# Patient Record
Sex: FEMALE | Race: WHITE
[De-identification: names, ages, dates, MRNs, and addresses within clinical notes are randomized per-mention and may not be internally consistent; named-entity substitution may affect disease eponyms.]

---

## 2019-02-13 VITALS — SYSTOLIC BLOOD PRESSURE: 145 MMHG | DIASTOLIC BLOOD PRESSURE: 73 MMHG

## 2019-02-13 LAB
APTT PPP: 29.4 SEC (ref 26.3–35.5)
BASOPHILS NFR BLD AUTO: 0.8 % (ref 0–5)
BUN SERPL-MCNC: 17 MG/DL (ref 7–18)
CHLORIDE SERPL-SCNC: 106 MMOL/L (ref 101–111)
CO2 SERPL-SCNC: 31 MMOL/L (ref 21–32)
CREAT SERPL-MCNC: 0.9 MG/DL (ref 0.5–1.5)
EOSINOPHIL NFR BLD AUTO: 5.3 % (ref 0–8)
ERYTHROCYTE [DISTWIDTH] IN BLOOD BY AUTOMATED COUNT: 13.5 % (ref 11–15.5)
GFR SERPL CREATININE-BSD FRML MDRD: 66 ML/MIN (ref 60–?)
GLUCOSE SERPL-MCNC: 113 MG/DL (ref 70–105)
HCT VFR BLD AUTO: 40.9 % (ref 36–48)
INR PPP: 0.95 (ref 0.85–1.15)
LYMPHOCYTES NFR SPEC AUTO: 26.7 % (ref 21–51)
MCH RBC QN AUTO: 29.9 PG (ref 27–33)
MCHC RBC AUTO-ENTMCNC: 33.5 G/DL (ref 32–36)
MCV RBC AUTO: 89.4 FL (ref 79–99)
MONOCYTES NFR BLD AUTO: 8 % (ref 3–13)
NEUTROPHILS NFR BLD AUTO: 59.2 % (ref 40–77)
NRBC BLD MANUAL-RTO: 0.1 % (ref 0–0.19)
PH UR STRIP: 6.5 [PH] (ref 5–8)
PLATELET # BLD AUTO: 218 K/UL (ref 130–400)
POTASSIUM SERPL-SCNC: 4.1 MMOL/L (ref 3.5–5.1)
PROTHROMBIN TIME: 10 SEC (ref 9.6–11.6)
RBC # BLD AUTO: 4.57 MIL/UL (ref 4–5.5)
SODIUM SERPL-SCNC: 143 MMOL/L (ref 136–145)
SP GR UR STRIP: 1.01 (ref 1–1.03)
UROBILINOGEN UR STRIP-MCNC: 0.2 MG/DL (ref 0.2–1)
WBC # BLD AUTO: 4.4 K/UL (ref 4.8–10.8)

## 2019-02-15 ENCOUNTER — HOSPITAL ENCOUNTER (OUTPATIENT)
Dept: HOSPITAL 90 - DAH | Age: 69
Discharge: HOME | End: 2019-02-15
Attending: INTERNAL MEDICINE
Payer: COMMERCIAL

## 2019-02-15 VITALS — SYSTOLIC BLOOD PRESSURE: 160 MMHG | DIASTOLIC BLOOD PRESSURE: 124 MMHG

## 2019-02-15 VITALS — DIASTOLIC BLOOD PRESSURE: 73 MMHG | SYSTOLIC BLOOD PRESSURE: 154 MMHG

## 2019-02-15 VITALS — DIASTOLIC BLOOD PRESSURE: 65 MMHG | SYSTOLIC BLOOD PRESSURE: 167 MMHG

## 2019-02-15 VITALS — HEIGHT: 62 IN | BODY MASS INDEX: 45.38 KG/M2 | WEIGHT: 246.6 LBS

## 2019-02-15 VITALS — DIASTOLIC BLOOD PRESSURE: 63 MMHG | SYSTOLIC BLOOD PRESSURE: 159 MMHG

## 2019-02-15 VITALS — DIASTOLIC BLOOD PRESSURE: 72 MMHG | SYSTOLIC BLOOD PRESSURE: 135 MMHG

## 2019-02-15 VITALS — DIASTOLIC BLOOD PRESSURE: 65 MMHG | SYSTOLIC BLOOD PRESSURE: 168 MMHG

## 2019-02-15 VITALS — SYSTOLIC BLOOD PRESSURE: 145 MMHG | DIASTOLIC BLOOD PRESSURE: 56 MMHG

## 2019-02-15 VITALS — SYSTOLIC BLOOD PRESSURE: 155 MMHG | DIASTOLIC BLOOD PRESSURE: 67 MMHG

## 2019-02-15 VITALS — SYSTOLIC BLOOD PRESSURE: 165 MMHG | DIASTOLIC BLOOD PRESSURE: 72 MMHG

## 2019-02-15 VITALS — SYSTOLIC BLOOD PRESSURE: 143 MMHG | DIASTOLIC BLOOD PRESSURE: 71 MMHG

## 2019-02-15 VITALS — DIASTOLIC BLOOD PRESSURE: 77 MMHG | SYSTOLIC BLOOD PRESSURE: 169 MMHG

## 2019-02-15 VITALS — DIASTOLIC BLOOD PRESSURE: 67 MMHG | SYSTOLIC BLOOD PRESSURE: 162 MMHG

## 2019-02-15 DIAGNOSIS — E03.9: ICD-10-CM

## 2019-02-15 DIAGNOSIS — Z82.49: ICD-10-CM

## 2019-02-15 DIAGNOSIS — Z88.8: ICD-10-CM

## 2019-02-15 DIAGNOSIS — I25.118: Primary | ICD-10-CM

## 2019-02-15 DIAGNOSIS — Z79.899: ICD-10-CM

## 2019-02-15 DIAGNOSIS — R00.1: ICD-10-CM

## 2019-02-15 DIAGNOSIS — R07.9: ICD-10-CM

## 2019-02-15 DIAGNOSIS — E66.01: ICD-10-CM

## 2019-02-15 DIAGNOSIS — I10: ICD-10-CM

## 2019-02-15 PROCEDURE — 99156 MOD SED OTH PHYS/QHP 5/>YRS: CPT

## 2019-02-15 PROCEDURE — 80048 BASIC METABOLIC PNL TOTAL CA: CPT

## 2019-02-15 PROCEDURE — 99157 MOD SED OTHER PHYS/QHP EA: CPT

## 2019-02-15 PROCEDURE — 36415 COLL VENOUS BLD VENIPUNCTURE: CPT

## 2019-02-15 PROCEDURE — 71045 X-RAY EXAM CHEST 1 VIEW: CPT

## 2019-02-15 PROCEDURE — 93458 L HRT ARTERY/VENTRICLE ANGIO: CPT

## 2019-02-15 PROCEDURE — 93005 ELECTROCARDIOGRAM TRACING: CPT

## 2019-02-15 PROCEDURE — 81003 URINALYSIS AUTO W/O SCOPE: CPT

## 2019-02-15 PROCEDURE — 85730 THROMBOPLASTIN TIME PARTIAL: CPT

## 2019-02-15 PROCEDURE — 85610 PROTHROMBIN TIME: CPT

## 2019-02-15 PROCEDURE — 85025 COMPLETE CBC W/AUTO DIFF WBC: CPT

## 2019-02-15 NOTE — NUR
Pt Returned from cath lab post LH



Via stretcher post [facility]. Pt awake alert and oriented. Pt denies any discomfort at this time. 
Right groin perclose utilized, site soft and nontender. Bilateral pedal pulses +2. Post 
assessment completed. No distress noted. Will continue to monitor.

## 2019-02-15 NOTE — NUR
PT dc home

PT continues AAOX3, denies any pain or discomfort at this time. Perclose to right groin dry 
and intact. Pt and spouse given discharge instructions given. Pt aware of f/u appt to pick 
up holter monitor at [place] office on 2/19/19 at 0830am. Additional followup appt with MD Jones on 2/22/19 at 845am at the [place] office. Pt given new medication prescription. 
PT is to start clopidogrel 75mg PO 12 tablet daily. Start atorvastatin 20mg one tablet by 
mouth in evenings. Pt is to discontinue isosorbide medication. Pt given  [facility] puncture site 
care along with education on importance of bleeding complication preventions. Discharge 
assessment  completed. PT dc via wheelchair.